# Patient Record
Sex: MALE | Race: WHITE | NOT HISPANIC OR LATINO | URBAN - METROPOLITAN AREA
[De-identification: names, ages, dates, MRNs, and addresses within clinical notes are randomized per-mention and may not be internally consistent; named-entity substitution may affect disease eponyms.]

---

## 2021-04-27 ENCOUNTER — WEB ENCOUNTER (OUTPATIENT)
Dept: URBAN - METROPOLITAN AREA CLINIC 35 | Facility: CLINIC | Age: 56
End: 2021-04-27

## 2021-05-03 ENCOUNTER — OFFICE VISIT (OUTPATIENT)
Dept: URBAN - METROPOLITAN AREA CLINIC 33 | Facility: CLINIC | Age: 56
End: 2021-05-03

## 2021-05-03 VITALS
HEART RATE: 79 BPM | HEIGHT: 71 IN | OXYGEN SATURATION: 99 % | DIASTOLIC BLOOD PRESSURE: 80 MMHG | WEIGHT: 230 LBS | BODY MASS INDEX: 32.2 KG/M2 | SYSTOLIC BLOOD PRESSURE: 140 MMHG

## 2021-05-03 PROBLEM — 275978004 SCREENING FOR MALIGNANT NEOPLASM OF COLON: Status: ACTIVE | Noted: 2021-05-03

## 2021-05-03 RX ORDER — HYDROCHLOROTHIAZIDE 12.5 MG/1
1 TABLET IN THE MORNING TABLET ORAL ONCE A DAY
Qty: 30 | Status: ACTIVE | COMMUNITY

## 2021-05-03 RX ORDER — SODIUM, POTASSIUM,MAG SULFATES 17.5-3.13G
ML AS DIRECTED SOLUTION, RECONSTITUTED, ORAL ORAL
Refills: 0 | OUTPATIENT
Start: 2021-05-03

## 2021-05-03 RX ORDER — HYDROXYZINE HYDROCHLORIDE 50 MG/ML
1 ML AS NEEDED INJECTION, SOLUTION INTRAMUSCULAR
Qty: 120 | Status: ACTIVE | COMMUNITY

## 2021-05-03 RX ORDER — TADALAFIL 10 MG/1
1 TABLET AS NEEDED TABLET, FILM COATED ORAL
Qty: 9 | Status: ACTIVE | COMMUNITY

## 2021-05-03 RX ORDER — OMEPRAZOLE 20 MG/1
1 TABLET TABLET, DELAYED RELEASE ORAL ONCE A DAY
Status: ACTIVE | COMMUNITY

## 2021-05-03 RX ORDER — QUINAPRIL HYDROCHLORIDE AND HYDROCHLOROTHIAZIDE 20; 12.5 MG/1; MG/1
1 TABLET TABLET, FILM COATED ORAL ONCE A DAY
Status: ACTIVE | COMMUNITY

## 2021-05-03 NOTE — HPI-MIGRATED HPI
;   ;     Heartburn : Patient admits longstanding history of heartburn for about 20 years ago.  Symptoms are described as acidic taste in mouth and is located in the sternum region.  Patient states that they have tried Omeprazole 20mg. with relief of symptoms. Patient denies dysphagia, excessive belching, globus, sour eructations, admits to bloating and gas, Denies indigestion, early satiety, changes in appetite, coughing, abdominal/epigastric pain, or changes in bowel habits.  Patient states that they have not had any recent changes in their medications.   Patient denies family hx of gastric/esophageal cancer or diseases.  Patient denies past EGD.   Patient presents for follow up of GERD. Patient admits continuing to take Omeprazole 20mg with or without control of symptoms.   Patient denies nausea and vomiting.     Of last visit (07/18/2019) 53 year old male patient presents for heartburn consult. Patient states he has been experiecing symptoms for many years . He describes symptoms as chest flutters that he thinks may be esophageal spasms . Patient denies any one thing that may trigger symptoms . Patient admits taking Omeprazole 20mg for the past two years. Patient denies nausea or vomiting. He admits his last EGD was approximately 20 years ago that per patient revealed a Hiatal Hernia .     Last visit (05/02/2019) 53 year old male patient presents for heartburn consult. Patient states he has been experiecing symptoms for many years . He describes symptoms as chest flutters that he thinks may be esophageal spasms . Patient denies any one thing that may trigger symptoms . Patient admits taking Omeprazole 20mg for the past two years. Patient denies nausea or vomiting. He admits his last EGD was approximately 20 years ago that per patient revealed a Hiatal Hernia .;   Colorectal Cancer Screening : Patient presents today for a consultation for a colorectal cancer screening. Patient denies this is his first colonoscopy. He denies a family hx of colon, gastric, or esophageal cancer/polyps. Patient currently admits 1 bowel movement every other day.  Stools are formed.  Patient admits symptoms of heartburn and acid reflux which is kept controlled with the use of Omeprazole 10mg.   Patient admits to having a Colonoscopy done over 15 years ago with NL findings.   ;

## 2021-05-03 NOTE — EXAM-MIGRATED EXAMINATIONS
GENERAL APPEARANCE: - alert, in no acute distress, well developed, well nourished;   HEAD: - normocephalic, atraumatic;   ORAL CAVITY: - mucosa moist;   THROAT: - clear;   NECK/THYROID: - neck supple, full range of motion, no cervical lymphadenopathy, no thyroid nodules, no thyromegaly, trachea midline;   HEART: - no murmurs, regular rate and rhythm, S1, S2 normal;   LUNGS: - clear to auscultation bilaterally, good air movement, no wheezes, rales, rhonchi;   ABDOMEN: - bowel sounds present, no masses palpable, no organomegaly , no rebound tenderness, soft, nontender, nondistended;

## 2021-06-21 ENCOUNTER — TELEPHONE ENCOUNTER (OUTPATIENT)
Dept: URBAN - METROPOLITAN AREA CLINIC 35 | Facility: CLINIC | Age: 56
End: 2021-06-21

## 2021-06-21 RX ORDER — SODIUM, POTASSIUM,MAG SULFATES 17.5-3.13G
ML AS DIRECTED SOLUTION, RECONSTITUTED, ORAL ORAL
Qty: 1 KIT | Refills: 0 | OUTPATIENT
Start: 2021-05-03

## 2021-06-23 ENCOUNTER — OFFICE VISIT (OUTPATIENT)
Dept: URBAN - METROPOLITAN AREA MEDICAL CENTER 10 | Facility: MEDICAL CENTER | Age: 56
End: 2021-06-23

## 2021-07-07 ENCOUNTER — OFFICE VISIT (OUTPATIENT)
Dept: URBAN - METROPOLITAN AREA CLINIC 33 | Facility: CLINIC | Age: 56
End: 2021-07-07

## 2021-07-07 VITALS
DIASTOLIC BLOOD PRESSURE: 96 MMHG | HEIGHT: 71 IN | SYSTOLIC BLOOD PRESSURE: 132 MMHG | OXYGEN SATURATION: 98 % | BODY MASS INDEX: 33.32 KG/M2 | WEIGHT: 238 LBS | HEART RATE: 79 BPM

## 2021-07-07 RX ORDER — HYDROXYZINE HYDROCHLORIDE 50 MG/ML
1 ML AS NEEDED INJECTION, SOLUTION INTRAMUSCULAR
Qty: 120 | Status: ACTIVE | COMMUNITY

## 2021-07-07 RX ORDER — QUINAPRIL HYDROCHLORIDE AND HYDROCHLOROTHIAZIDE 20; 12.5 MG/1; MG/1
1 TABLET TABLET, FILM COATED ORAL ONCE A DAY
Status: ACTIVE | COMMUNITY

## 2021-07-07 RX ORDER — SODIUM, POTASSIUM,MAG SULFATES 17.5-3.13G
ML AS DIRECTED SOLUTION, RECONSTITUTED, ORAL ORAL
Qty: 1 KIT | Refills: 0 | Status: DISCONTINUED | COMMUNITY
Start: 2021-05-03

## 2021-07-07 RX ORDER — OMEPRAZOLE 20 MG/1
1 TABLET TABLET, DELAYED RELEASE ORAL ONCE A DAY
Status: ACTIVE | COMMUNITY

## 2021-07-07 RX ORDER — TADALAFIL 10 MG/1
1 TABLET AS NEEDED TABLET, FILM COATED ORAL
Qty: 9 | Status: ACTIVE | COMMUNITY

## 2021-07-07 RX ORDER — HYDROCHLOROTHIAZIDE 12.5 MG/1
1 TABLET IN THE MORNING TABLET ORAL ONCE A DAY
Qty: 30 | Status: DISCONTINUED | COMMUNITY

## 2021-07-07 NOTE — HPI-MIGRATED HPI
;     Heartburn : Patient admits longstanding history of heartburn for about 20 years ago.  Symptoms are described as acidic taste in mouth and is located in the sternum region.  Patient states that they have tried Omeprazole 20mg. with relief of symptoms. Patient denies dysphagia, excessive belching, globus, sour eructations, admits to bloating and gas, Denies indigestion, early satiety, changes in appetite, coughing, abdominal/epigastric pain, or changes in bowel habits.  Patient states that they have not had any recent changes in their medications.   Patient denies family hx of gastric/esophageal cancer or diseases.  Patient denies past EGD.   Patient presents for follow up of GERD. Patient admits continuing to take Omeprazole 20mg with or without control of symptoms.   Patient denies nausea and vomiting.     Of last visit (07/18/2019) 53 year old male patient presents for heartburn consult. Patient states he has been experiecing symptoms for many years . He describes symptoms as chest flutters that he thinks may be esophageal spasms . Patient denies any one thing that may trigger symptoms . Patient admits taking Omeprazole 20mg for the past two years. Patient denies nausea or vomiting. He admits his last EGD was approximately 20 years ago that per patient revealed a Hiatal Hernia .     Last visit (05/02/2019) 53 year old male patient presents for heartburn consult. Patient states he has been experiecing symptoms for many years . He describes symptoms as chest flutters that he thinks may be esophageal spasms . Patient denies any one thing that may trigger symptoms . Patient admits taking Omeprazole 20mg for the past two years. Patient denies nausea or vomiting. He admits his last EGD was approximately 20 years ago that per patient revealed a Hiatal Hernia .;

## 2022-03-04 ENCOUNTER — OFFICE VISIT (OUTPATIENT)
Dept: URBAN - METROPOLITAN AREA CLINIC 35 | Facility: CLINIC | Age: 57
End: 2022-03-04

## 2022-03-04 RX ORDER — QUINAPRIL HYDROCHLORIDE AND HYDROCHLOROTHIAZIDE 20; 12.5 MG/1; MG/1
1 TABLET TABLET, FILM COATED ORAL ONCE A DAY
Status: ACTIVE | COMMUNITY

## 2022-03-04 RX ORDER — TADALAFIL 10 MG/1
1 TABLET AS NEEDED TABLET, FILM COATED ORAL
Qty: 9 | Status: ACTIVE | COMMUNITY

## 2022-03-04 RX ORDER — HYDROXYZINE HYDROCHLORIDE 50 MG/ML
1 ML AS NEEDED INJECTION, SOLUTION INTRAMUSCULAR
Qty: 120 | Status: ACTIVE | COMMUNITY

## 2022-03-04 RX ORDER — OMEPRAZOLE 20 MG/1
1 TABLET TABLET, DELAYED RELEASE ORAL ONCE A DAY
Status: ACTIVE | COMMUNITY

## 2022-03-10 ENCOUNTER — OFFICE VISIT (OUTPATIENT)
Dept: URBAN - METROPOLITAN AREA CLINIC 35 | Facility: CLINIC | Age: 57
End: 2022-03-10
Payer: COMMERCIAL

## 2022-03-10 ENCOUNTER — DASHBOARD ENCOUNTERS (OUTPATIENT)
Age: 57
End: 2022-03-10

## 2022-03-10 VITALS
BODY MASS INDEX: 33.74 KG/M2 | HEART RATE: 119 BPM | DIASTOLIC BLOOD PRESSURE: 86 MMHG | SYSTOLIC BLOOD PRESSURE: 160 MMHG | OXYGEN SATURATION: 98 % | HEIGHT: 71 IN | WEIGHT: 241 LBS

## 2022-03-10 DIAGNOSIS — L29.0 RECTAL ITCHING: ICD-10-CM

## 2022-03-10 DIAGNOSIS — K21.9 GASTRO-ESOPHAGEAL REFLUX DISEASE WITHOUT ESOPHAGITIS: ICD-10-CM

## 2022-03-10 DIAGNOSIS — G47.33 OBSTRUCTIVE SLEEP APNEA (ADULT) (PEDIATRIC): ICD-10-CM

## 2022-03-10 PROBLEM — 78275009 OBSTRUCTIVE SLEEP APNEA SYNDROME: Status: ACTIVE | Noted: 2019-05-02

## 2022-03-10 PROBLEM — 90446007: Status: ACTIVE | Noted: 2022-03-10

## 2022-03-10 PROBLEM — 266435005 GASTRO-ESOPHAGEAL REFLUX DISEASE WITHOUT ESOPHAGITIS: Status: ACTIVE | Noted: 2019-03-18

## 2022-03-10 PROCEDURE — 99204 OFFICE O/P NEW MOD 45 MIN: CPT | Performed by: INTERNAL MEDICINE

## 2022-03-10 RX ORDER — TADALAFIL 10 MG/1
1 TABLET AS NEEDED TABLET, FILM COATED ORAL
Qty: 9 | Status: ACTIVE | COMMUNITY

## 2022-03-10 RX ORDER — QUINAPRIL HYDROCHLORIDE AND HYDROCHLOROTHIAZIDE 20; 12.5 MG/1; MG/1
1 TABLET TABLET, FILM COATED ORAL ONCE A DAY
Status: ACTIVE | COMMUNITY

## 2022-03-10 RX ORDER — HYDROXYZINE HYDROCHLORIDE 50 MG/ML
1 ML AS NEEDED INJECTION, SOLUTION INTRAMUSCULAR
Qty: 120 | Status: ON HOLD | COMMUNITY

## 2022-03-10 RX ORDER — OMEPRAZOLE 20 MG/1
1 TABLET TABLET, DELAYED RELEASE ORAL ONCE A DAY
Status: ACTIVE | COMMUNITY

## 2022-03-10 NOTE — HPI-RECTAL PAIN
56 year old male patient presents today for consultation about Rectal Pain .   Onset was  about 3 weeks ago and course has been persistent.  Patient states after bowel movement he has itching burning and its worse because he can never get a good wipe  its always left over residue ; he has even tried wet wipes with no relief . Patient also states he has the urge to pass more stool but cant  and he doesnt want to strain . He admits after wiping he may sometimes see blood upon tissue bright red . He also states he has had instances when he is running on treadmill and he will get the same burning itching irritation at rectum. And after wiping area  he may see bright red blood . Denies trying preparation H cream or any other OTC Medications for relief .   Patient admits saw urologist with no findings , he did prostate exam .

## 2022-03-11 ENCOUNTER — TELEPHONE ENCOUNTER (OUTPATIENT)
Dept: URBAN - METROPOLITAN AREA CLINIC 36 | Facility: CLINIC | Age: 57
End: 2022-03-11

## 2022-03-11 RX ORDER — OMEPRAZOLE 20 MG/1
1 TABLET TABLET, DELAYED RELEASE ORAL ONCE A DAY
Status: ACTIVE | COMMUNITY

## 2022-03-11 RX ORDER — TADALAFIL 10 MG/1
1 TABLET AS NEEDED TABLET, FILM COATED ORAL
Qty: 9 | Status: ACTIVE | COMMUNITY

## 2022-03-11 RX ORDER — QUINAPRIL HYDROCHLORIDE AND HYDROCHLOROTHIAZIDE 20; 12.5 MG/1; MG/1
1 TABLET TABLET, FILM COATED ORAL ONCE A DAY
Status: ACTIVE | COMMUNITY

## 2022-03-11 RX ORDER — HYDROXYZINE HYDROCHLORIDE 50 MG/ML
1 ML AS NEEDED INJECTION, SOLUTION INTRAMUSCULAR
Qty: 120 | Status: ON HOLD | COMMUNITY

## 2022-03-11 RX ORDER — CLOTRIMAZOLE AND BETAMETHASONE DIPROPIONATE 10; .5 MG/G; MG/G
1 APPLICATION CREAM TOPICAL TWICE A DAY
Qty: 1 TUBE | Refills: 5 | OUTPATIENT

## 2022-03-14 ENCOUNTER — OFFICE VISIT (OUTPATIENT)
Dept: URBAN - METROPOLITAN AREA CLINIC 33 | Facility: CLINIC | Age: 57
End: 2022-03-14

## 2022-04-22 ENCOUNTER — OFFICE VISIT (OUTPATIENT)
Dept: URBAN - METROPOLITAN AREA CLINIC 35 | Facility: CLINIC | Age: 57
End: 2022-04-22

## 2022-04-22 NOTE — HPI-RECTAL PAIN
Patient presents for follow up of rectal pain. Patient admits /denies improvement of symptoms since last visit . He admits /denies using Lotrisone cream as needed .    Of last vissit (03/10/2022)  56 year old male patient presents today for consultation about Rectal Pain .   Onset was  about 3 weeks ago and course has been persistent.  Patient states after bowel movement he has itching burning and its worse because he can never get a good wipe  its always left over residue ; he has even tried wet wipes with no relief . Patient also states he has the urge to pass more stool but cant  and he doesnt want to strain . He admits after wiping he may sometimes see blood upon tissue bright red . He also states he has had instances when he is running on treadmill and he will get the same burning itching irritation at rectum. And after wiping area  he may see bright red blood . Denies trying preparation H cream or any other OTC Medications for relief .   Patient admits saw urologist with no findings , he did prostate exam .

## 2022-10-20 ENCOUNTER — TELEPHONE ENCOUNTER (OUTPATIENT)
Dept: URBAN - METROPOLITAN AREA CLINIC 35 | Facility: CLINIC | Age: 57
End: 2022-10-20